# Patient Record
Sex: FEMALE | Race: WHITE | URBAN - METROPOLITAN AREA
[De-identification: names, ages, dates, MRNs, and addresses within clinical notes are randomized per-mention and may not be internally consistent; named-entity substitution may affect disease eponyms.]

---

## 2017-09-15 ENCOUNTER — EMERGENCY (EMERGENCY)
Facility: HOSPITAL | Age: 27
LOS: 1 days | Discharge: HOME | End: 2017-09-15
Admitting: EMERGENCY MEDICINE
Payer: COMMERCIAL

## 2017-09-15 VITALS
HEART RATE: 102 BPM | TEMPERATURE: 98 F | DIASTOLIC BLOOD PRESSURE: 95 MMHG | RESPIRATION RATE: 16 BRPM | SYSTOLIC BLOOD PRESSURE: 153 MMHG | OXYGEN SATURATION: 99 %

## 2017-09-15 DIAGNOSIS — M79.661 PAIN IN RIGHT LOWER LEG: ICD-10-CM

## 2017-09-15 DIAGNOSIS — Z88.0 ALLERGY STATUS TO PENICILLIN: ICD-10-CM

## 2017-09-15 LAB — D DIMER BLD IA.RAPID-MCNC: <150 NG/ML DDU — SIGNIFICANT CHANGE UP

## 2017-09-15 PROCEDURE — 99284 EMERGENCY DEPT VISIT MOD MDM: CPT

## 2017-09-15 RX ORDER — IBUPROFEN 200 MG
600 TABLET ORAL ONCE
Qty: 0 | Refills: 0 | Status: COMPLETED | OUTPATIENT
Start: 2017-09-15 | End: 2017-09-15

## 2017-09-15 RX ADMIN — Medication 600 MILLIGRAM(S): at 18:30

## 2017-09-15 NOTE — ED PROVIDER NOTE - MEDICAL DECISION MAKING DETAILS
pt presents with right calf pain to r/o DVT. Will do d-dimer and re-asses. if negative will instruct RICE. No clinical indication for x-ray.

## 2017-09-15 NOTE — ED PROVIDER NOTE - OBJECTIVE STATEMENT
27 yo F with no PMHx presents to the ED c/o sprained right ankle x1 month ago while hiking. Has done other strenuous activity where she believes has slowed down her healing process. Now presenting with pain to the right calf. Was discussing with her friends and was told to come to the ED to r/o possible DVT. Denies SOB and difficulty breathing. Not on oral contraceptives. Non smoker. No recent travel, and no lower extremity swelling.

## 2018-07-27 ENCOUNTER — RESULT REVIEW (OUTPATIENT)
Age: 28
End: 2018-07-27

## 2018-08-07 ENCOUNTER — TRANSCRIPTION ENCOUNTER (OUTPATIENT)
Age: 28
End: 2018-08-07